# Patient Record
Sex: MALE | URBAN - NONMETROPOLITAN AREA
[De-identification: names, ages, dates, MRNs, and addresses within clinical notes are randomized per-mention and may not be internally consistent; named-entity substitution may affect disease eponyms.]

---

## 2023-06-01 ENCOUNTER — TELEPHONE (OUTPATIENT)
Dept: NEUROSURGERY | Age: 68
End: 2023-06-01

## 2023-06-01 NOTE — TELEPHONE ENCOUNTER
FIRST AVAILABLE, PT VU DR Ankit Nguyen. [facility] Neurosurgery New Patient Questionnaire    Diagnosis/Reason for Referral?    Spinal stenosis, cervical region    2. Who is completing questionnaire? Patient  Caregiver Family      3. Has the patient had any previous spinal/brain surgeries? YES L5/6 REMOVAL      A. If yes, what is the name of the facility in which the surgery was performed? B. Procedure/Surgery performed? C. Who was the surgeon? D. When was the surgery? 21 YEARS AGO       E. Did the patient improve after the surgery? YES      4. Is this a second opinion? If yes, Dr. Perla Shaw would like to review patient first before making the appointment. NO    5. Have MRI Images been obtain within the last year? Yes  No      XR  CT     If yes, where was the imaging performed? [address]   If yes, what part of the body? Lumbar  Cervical  Thoracic  Brain     If yes, when was it obtained? 5/25/23    Note: if the scan was performed at a facility other than [address] [place] [address], the disc will need to be brought to the appointment or we need to reach out to obtain the disc. A. Was the patient instructed to provide the disc? Yes   No      8. Has the patient had a NCV/EMG within the last year? Yes  No     If yes, where was it performed and date? MM/YY  Location:      9. Has the patient been to Physical Therapy? Yes  No     If yes, what location, how long attended, and last visit? Location:        Therapy Lasted:    Date of Last Visit:      10. Has the patient been to Pain Management? Yes  No     If yes, what location and last visit     Location:   Last Visit:   Is it helping?

## 2023-06-20 ENCOUNTER — OFFICE VISIT (OUTPATIENT)
Dept: NEUROSURGERY | Age: 68
End: 2023-06-20
Payer: MEDICARE

## 2023-06-20 VITALS
WEIGHT: 190 LBS | RESPIRATION RATE: 18 BRPM | DIASTOLIC BLOOD PRESSURE: 82 MMHG | HEART RATE: 80 BPM | BODY MASS INDEX: 25.73 KG/M2 | SYSTOLIC BLOOD PRESSURE: 132 MMHG | HEIGHT: 72 IN

## 2023-06-20 DIAGNOSIS — M54.12 CERVICAL RADICULOPATHY: ICD-10-CM

## 2023-06-20 DIAGNOSIS — M48.02 CERVICAL SPINAL STENOSIS: ICD-10-CM

## 2023-06-20 DIAGNOSIS — M48.02 FORAMINAL STENOSIS OF CERVICAL REGION: Primary | ICD-10-CM

## 2023-06-20 PROCEDURE — 99204 OFFICE O/P NEW MOD 45 MIN: CPT | Performed by: NEUROLOGICAL SURGERY

## 2023-06-20 PROCEDURE — 1123F ACP DISCUSS/DSCN MKR DOCD: CPT | Performed by: NEUROLOGICAL SURGERY

## 2023-06-20 PROCEDURE — 3017F COLORECTAL CA SCREEN DOC REV: CPT | Performed by: NEUROLOGICAL SURGERY

## 2023-06-20 PROCEDURE — G8419 CALC BMI OUT NRM PARAM NOF/U: HCPCS | Performed by: NEUROLOGICAL SURGERY

## 2023-06-20 PROCEDURE — 4004F PT TOBACCO SCREEN RCVD TLK: CPT | Performed by: NEUROLOGICAL SURGERY

## 2023-06-20 PROCEDURE — G8427 DOCREV CUR MEDS BY ELIG CLIN: HCPCS | Performed by: NEUROLOGICAL SURGERY

## 2023-06-20 RX ORDER — ALFUZOSIN HYDROCHLORIDE 10 MG/1
10 TABLET, EXTENDED RELEASE ORAL DAILY
COMMUNITY

## 2023-06-20 RX ORDER — ASPIRIN 81 MG/1
81 TABLET ORAL DAILY
COMMUNITY

## 2023-06-20 RX ORDER — SIMVASTATIN 40 MG
TABLET ORAL
COMMUNITY
Start: 2023-05-23

## 2023-06-20 ASSESSMENT — ENCOUNTER SYMPTOMS
GASTROINTESTINAL NEGATIVE: 1
EYES NEGATIVE: 1
RESPIRATORY NEGATIVE: 1

## 2023-06-20 NOTE — PROGRESS NOTES
[facility] Neurosurgery  Office Visit        Chief Complaint   Patient presents with    New Patient     Establishing care    Results     Imaging pushed    Neck Pain     Patient states that he does not have neck pain, it is RUE numbness and weakness. He does not go to PT or PM.        HISTORY OF PRESENT ILLNESS:      The patient is a 69 y.o. male who presents for neurosurgical evaluation of right upper extremity pain, numbness and weakness. He states this began several months ago without a known injury or inciting event. He describes pain around his elbow that will radiate into his forearm and he has occasional weakness in his hand. He does also have occasional numbness in his hand, however this is not present today. He denies neck pain. He denies any worsening of his arm pain with neck movement. He does have a history of prior surgery on the left for ulnar neuropathy and he states that his current pain on the right is similar to what he previously experienced on the left. He has not had an EMG/NCS but he has had an MRI of the cervical spine. MEDICAL HISTORY:             No past medical history on file. No past surgical history on file. Current Outpatient Medications:     simvastatin (ZOCOR) 40 MG tablet, , Disp: , Rfl:     aspirin 81 MG EC tablet, Take 1 tablet by mouth daily, Disp: , Rfl:     alfuzosin (UROXATRAL) 10 MG extended release tablet, Take 1 tablet by mouth daily, Disp: , Rfl:     tiotropium-olodaterol (STIOLTO RESPIMAT) 2.5-2.5 MCG/ACT AERS, Inhale 2 puffs into the lungs daily, Disp: , Rfl:     Allergies:  Patient has no known allergies. Social History:   Social History     Tobacco Use   Smoking Status Unknown   Smokeless Tobacco Not on file     Social History     Substance and Sexual Activity   Alcohol Use Not Currently         Family History:   No family history on file. REVIEW OF SYSTEMS:    Constitutional: Negative. HENT: Negative. Eyes: Negative.     Respiratory:

## 2023-07-03 ENCOUNTER — HOSPITAL ENCOUNTER (OUTPATIENT)
Dept: NEUROLOGY | Age: 68
Discharge: HOME | End: 2023-07-03
Attending: NEUROLOGICAL SURGERY
Payer: MEDICARE

## 2023-07-03 PROBLEM — R20.0 NUMBNESS: Status: ACTIVE | Noted: 2023-07-03

## 2023-07-03 PROCEDURE — 95886 MUSC TEST DONE W/N TEST COMP: CPT | Performed by: PSYCHIATRY & NEUROLOGY

## 2023-07-03 PROCEDURE — 95909 NRV CNDJ TST 5-6 STUDIES: CPT

## 2023-07-03 PROCEDURE — 95909 NRV CNDJ TST 5-6 STUDIES: CPT | Performed by: PSYCHIATRY & NEUROLOGY

## 2023-07-03 PROCEDURE — 95886 MUSC TEST DONE W/N TEST COMP: CPT

## 2023-07-24 ENCOUNTER — OFFICE VISIT (OUTPATIENT)
Dept: NEUROSURGERY | Age: 68
End: 2023-07-24
Payer: MEDICARE

## 2023-07-24 VITALS
HEART RATE: 77 BPM | DIASTOLIC BLOOD PRESSURE: 70 MMHG | HEIGHT: 72 IN | BODY MASS INDEX: 25.73 KG/M2 | WEIGHT: 190 LBS | RESPIRATION RATE: 18 BRPM | SYSTOLIC BLOOD PRESSURE: 118 MMHG

## 2023-07-24 DIAGNOSIS — M48.02 FORAMINAL STENOSIS OF CERVICAL REGION: Primary | ICD-10-CM

## 2023-07-24 PROCEDURE — 99213 OFFICE O/P EST LOW 20 MIN: CPT | Performed by: NEUROLOGICAL SURGERY

## 2023-07-24 PROCEDURE — G8427 DOCREV CUR MEDS BY ELIG CLIN: HCPCS | Performed by: NEUROLOGICAL SURGERY

## 2023-07-24 PROCEDURE — G8419 CALC BMI OUT NRM PARAM NOF/U: HCPCS | Performed by: NEUROLOGICAL SURGERY

## 2023-07-24 PROCEDURE — 4004F PT TOBACCO SCREEN RCVD TLK: CPT | Performed by: NEUROLOGICAL SURGERY

## 2023-07-24 PROCEDURE — 1123F ACP DISCUSS/DSCN MKR DOCD: CPT | Performed by: NEUROLOGICAL SURGERY

## 2023-07-24 PROCEDURE — 3017F COLORECTAL CA SCREEN DOC REV: CPT | Performed by: NEUROLOGICAL SURGERY

## 2023-07-24 ASSESSMENT — ENCOUNTER SYMPTOMS
GASTROINTESTINAL NEGATIVE: 1
EYES NEGATIVE: 1
RESPIRATORY NEGATIVE: 1

## 2023-07-24 NOTE — PROGRESS NOTES
NEUROSURGERY FOLLOW UP      Chief Complaint:   Chief Complaint   Patient presents with    Follow-up     Patient is here to follow up after PT and NCS. He states that his neck pain is the same as last visit. Results     NCS 7/3/23         Interval Update:    Planned follow-up to review EMG/NCS results and his response to physical therapy. He reports that he is doing well. He continues to deny neck pain. His right arm pain has improved. He denies any numbness or weakness. He states he does not want any type of surgery. His EMG/NCS did not reveal any evidence of right ulnar neuropathy and was only significant for mild bilateral carpal tunnel syndrome. HPI:     The patient is a 69 y.o. male who presents for neurosurgical evaluation of right upper extremity pain, numbness and weakness. He states this began several months ago without a known injury or inciting event. He describes pain around his elbow that will radiate into his forearm and he has occasional weakness in his hand. He does also have occasional numbness in his hand, however this is not present today. He denies neck pain. He denies any worsening of his arm pain with neck movement. He does have a history of prior surgery on the left for ulnar neuropathy and he states that his current pain on the right is similar to what he previously experienced on the left. He has not had an EMG/NCS but he has had an MRI of the cervical spine. ROS:    Constitutional: Negative. HENT: Negative. Eyes: Negative. Respiratory: Negative. Cardiovascular: Negative. Gastrointestinal: Negative. Genitourinary: Negative. Musculoskeletal:  Positive for joint pain, myalgias and neck pain. Skin: Negative. Neurological:  Positive for dizziness, tingling, weakness and headaches. Endo/Heme/Allergies: Negative. Psychiatric/Behavioral: Negative.      Review of systems was obtained by the medical assistant and reviewed by
